# Patient Record
Sex: FEMALE | Race: WHITE | Employment: FULL TIME | URBAN - METROPOLITAN AREA
[De-identification: names, ages, dates, MRNs, and addresses within clinical notes are randomized per-mention and may not be internally consistent; named-entity substitution may affect disease eponyms.]

---

## 2017-05-27 PROCEDURE — 81001 URINALYSIS AUTO W/SCOPE: CPT | Performed by: FAMILY MEDICINE

## 2017-06-08 PROCEDURE — 87624 HPV HI-RISK TYP POOLED RSLT: CPT | Performed by: FAMILY MEDICINE

## 2017-06-08 PROCEDURE — 88175 CYTOPATH C/V AUTO FLUID REDO: CPT | Performed by: FAMILY MEDICINE

## 2019-08-20 PROCEDURE — 88175 CYTOPATH C/V AUTO FLUID REDO: CPT | Performed by: OBSTETRICS & GYNECOLOGY

## 2019-09-16 PROBLEM — R51.9 FREQUENT HEADACHES: Status: ACTIVE | Noted: 2019-09-16

## 2020-08-21 PROBLEM — J30.1 SEASONAL ALLERGIC RHINITIS DUE TO POLLEN: Status: ACTIVE | Noted: 2020-08-21

## 2020-08-21 PROBLEM — R51.9 FREQUENT HEADACHES: Status: RESOLVED | Noted: 2019-09-16 | Resolved: 2020-08-21

## 2020-08-21 PROBLEM — F41.9 ANXIETY: Status: ACTIVE | Noted: 2020-08-21

## 2020-08-21 PROBLEM — K21.9 GASTROESOPHAGEAL REFLUX DISEASE WITHOUT ESOPHAGITIS: Status: ACTIVE | Noted: 2020-08-21

## 2022-01-25 PROBLEM — Z34.00 SUPERVISION OF NORMAL FIRST PREGNANCY, ANTEPARTUM: Status: ACTIVE | Noted: 2022-01-25

## 2022-01-25 PROBLEM — Z34.00 SUPERVISION OF NORMAL FIRST PREGNANCY, ANTEPARTUM (HCC): Status: ACTIVE | Noted: 2022-01-25

## 2022-03-11 PROBLEM — D56.3 ALPHA THALASSEMIA SILENT CARRIER: Status: ACTIVE | Noted: 2022-03-11

## 2024-04-22 LAB
HEPATITIS B SURFACE ANTIGEN OB: NEGATIVE
RAPID PLASMA REAGIN OB: NONREACTIVE

## 2024-08-16 LAB
HIV RESULT OB: NEGATIVE
RAPID PLASMA REAGIN OB: NONREACTIVE

## 2024-10-17 ENCOUNTER — HOSPITAL ENCOUNTER (OUTPATIENT)
Facility: HOSPITAL | Age: 34
Discharge: HOME | End: 2024-10-17
Attending: OBSTETRICS & GYNECOLOGY | Admitting: OBSTETRICS & GYNECOLOGY
Payer: COMMERCIAL

## 2024-10-17 VITALS
DIASTOLIC BLOOD PRESSURE: 68 MMHG | BODY MASS INDEX: 30.78 KG/M2 | HEIGHT: 61 IN | SYSTOLIC BLOOD PRESSURE: 104 MMHG | RESPIRATION RATE: 16 BRPM | HEART RATE: 85 BPM | TEMPERATURE: 98 F | WEIGHT: 163 LBS

## 2024-10-17 PROCEDURE — 59025 FETAL NON-STRESS TEST: CPT

## 2024-10-17 PROCEDURE — 99204 OFFICE O/P NEW MOD 45 MIN: CPT

## 2024-10-17 NOTE — TRIAGE
[facility]  part of [facility]      Triage Note    Christal Marcus Patient Status:  Outpatient    1990 MRN [id number]   Location [facility] Attending Bri Wilson MD   Hosp Day # 0 PCP Ross Shrestha DO      Para:   Estimated Date of Delivery: 24  Gestation: 37w1d    Chief Complaint    R/o Rom         Allergies:  Allergies[1]    Orders Placed This Encounter   Procedures    Rubella Antibodies, IgG    RPR W/Conf    RPR W/Conf    Hepatitis B Surface Antigen    Rapid HIV    POCT Ferning       Lab Results   Component Value Date    WBC 9.49 2022    HGB 13.1 2022    HCT 39.3 2022     2022    CREATSERUM 0.53 2021    BUN 16.0 2021     2021    K 4.35 2021     2021    CO2 23.3 2021    GLU 84 2021    CA 9.5 2021    ALB 4.3 2022    ALKPHO 57 2022    BILT 0.23 2022    TP 7.4 2022    AST 17 2022    ALT 17 2022    T4F 1.30 2022    TSH 0.298 2022       Clinitek UA  Lab Results   Component Value Date    GLUUR Negative 2017    SPECGRAVITY 1.020 2017       UA  Lab Results   Component Value Date    COLORUR Yellow 2017    CLARITY Hazy (A) 2017    SPECGRAVITY 1.020 2017    PROUR Negative 2017    GLUUR Negative 2017    KETUR Negative 2017    BILUR Negative 2017    BLOODURINE Small (A) 2017    NITRITE Negative 2022    UROBILINOGEN <2.0 2017    LEUUR Negative 2017       Vitals:    10/17/24 1045 10/17/24 1051   BP: 104/68    Pulse: 85    Resp: 16    Temp: 97.7 °F (36.5 °C)    TempSrc: Oral    Weight:  73.9 kg (163 lb)   Height: 154.9 cm (5' 1\")        NST:  Reactive  Variability: Moderate           Accelerations: Yes           Decelerations: None            Baseline: 130 BPM           Uterine Irritability: No           Contractions: Not present                                         Acoustic Stimulator: No           Nonstress Test Interpretation: Reactive           Nonstress Test Second Interpretation: Reactive          FHR Category: Category I        Chief Complaint   Patient presents with    R/o Rom     Pt. Reports leaking fluid since 08.          @ 37 1/7 wks. here with c/o leaking.   SSE no pooling, negative amniotest, ferning not present.   NST reactive.  Dr. Wlison notified.     Discharged home  Ambulatory and in stable condition with written and verbal labor, and preeclampsia instructions. Patient verbalizes understanding of information given.     Michelle POLANCO RN  10/17/2024 11:31 AM         [1]   Allergies  Allergen Reactions    Amoxicillin HIVES

## 2024-10-17 NOTE — H&P
[facility]  part of [facility]    History & Physical    Christal Marcus Patient Status:  Outpatient    1990 MRN [id number]   Location [facility] Attending No att. providers found   Hosp Day # 0 PCP Ross Shrestha DO     Date of triage:  10/17/2024      HPI:   Christal Marcus is a 33 year old  female, current EGA of 37w1d with an estimated date of delivery of: 2024, by Last Menstrual Period who presents due to leakage of fluid    Pt reports feeling wet and was unsure if she broke her bag of water.    Pt denies N/V/F/C/CP/SOB, HA, blurry vision, dizziness, RUQ pain, ctx, VB.    Her current obstetrical history is significant for alpha thalassemia silent carrier, GDMA1, BMI 30.  Patient Active Problem List   Diagnosis    Scoliosis    Vitiligo    Seasonal allergic rhinitis due to pollen    Gastroesophageal reflux disease without esophagitis    Anxiety    Supervision of normal first pregnancy, antepartum (HCC)    Alpha thalassemia silent carrier         Fetal Movement reported as good.  GBS positive.   Rh negative.    History   Obstetric History:   OB History    Para Term  AB Living   2 1 1 0 0 1   SAB IAB Ectopic Multiple Live Births   0 0 0 0 1      # Outcome Date GA Lbr Cassius/2nd Weight Sex Type Anes PTL Lv   2 Current            1 Term 22 38w0d  7 lb 4 oz (3.289 kg) F  EPI N KARI      Obstetric Comments   3/2022: [facility] ([facility]) Genetic testing was negative for 273/274 conditions.  Pt is a SILENT CARRIER for Alpha-Thalassemia (aa/a-).         Gyne History:   Last pap smear: 2023: Negative cytology and HR-HPV not detected    Past Medical History:   Past Medical History:    Alpha thalassemia silent carrier    [facility] ([facility]) genetic testing       Past Surgerical History: No past surgical history on file.    Social History:   Social History     Tobacco Use    Smoking status: Never    Smokeless tobacco: Never   Substance Use  Topics    Alcohol use: Yes     Comment: social,occasional        Allergies/Medications:   Allergies:   Allergies[1]    Medications: PNV  Prescriptions Prior to Admission[2]      Review of Systems:   As documented in HPI      Physical Exam:   Temp:  [97.7 °F (36.5 °C)] 97.7 °F (36.5 °C)  Pulse:  [85] 85  Resp:  [16] 16  BP: (104)/(68) 104/68    Constitutional: alert and cooperative in No distress    Abdomen: soft,  nontender, gravid    Vaginal exam: PER RN: pooling negative, ferning (-), nitrazine (-).  Visually cervix FT.      FHT assessment:   Baseline: 130 bpm   Variability: moderate   Accels:  present   Decels: No   Tocos:  No   Category: 1 tracing    Neurologic: Alert and oriented  Psychiatric: Cooperative    Results:   No results found for this or any previous visit (from the past 24 hours).    No results found.        Assessment/Plan:   IUP 37w1d presented for r/o ROM    Obstetrical history significant for  alpha thalassemia silent carrier, GDMA1, BMI 30  Patient Active Problem List   Diagnosis    Scoliosis    Vitiligo    Seasonal allergic rhinitis due to pollen    Gastroesophageal reflux disease without esophagitis    Anxiety    Supervision of normal first pregnancy, antepartum (HCC)    Alpha thalassemia silent carrier         Treatment Plan:  Triage    Christal Marcus is a 33 year old  female, current EGA of 37w1d who presents for triage to rule out rupture of membranes     All questions answered; all appropriate consents will be signed at the Hospital.    IUP at 37w1d  Fetal heart tones category 1  Leakage of fluid: likely cervical mucous; on exam pooling (-), ferning (-), amnio/nitrazine (-); no evidence of active labor.  GBS positive: will need abx in labor  GDMA1: no acute issues; pt reports BS well controlled.    Disposition: discharge home; labor precautions and kick counts discussed.  Pt has an appt in 1 hr, explained she does not need to go to that appt.  She will return to the office next  week for appt and NST.      Bri Wilson MD  10/17/2024  12:31 PM        [1]   Allergies  Allergen Reactions    Amoxicillin HIVES   [2]   No medications prior to admission.

## 2024-10-26 ENCOUNTER — ANESTHESIA EVENT (OUTPATIENT)
Dept: OBGYN UNIT | Facility: HOSPITAL | Age: 34
End: 2024-10-26
Payer: COMMERCIAL

## 2024-10-26 ENCOUNTER — ANESTHESIA (OUTPATIENT)
Dept: OBGYN UNIT | Facility: HOSPITAL | Age: 34
End: 2024-10-26
Payer: COMMERCIAL

## 2024-10-26 ENCOUNTER — HOSPITAL ENCOUNTER (INPATIENT)
Facility: HOSPITAL | Age: 34
LOS: 2 days | Discharge: HOME | End: 2024-10-28
Attending: OBSTETRICS & GYNECOLOGY | Admitting: OBSTETRICS & GYNECOLOGY
Payer: COMMERCIAL

## 2024-10-26 DIAGNOSIS — Z34.90: Primary | ICD-10-CM

## 2024-10-26 LAB
ANTIBODY SCREEN: NEGATIVE
BASOPHILS # BLD AUTO: 0.03 X10(3) UL (ref 0–0.2)
BASOPHILS NFR BLD AUTO: 0.2 %
DEPRECATED RDW RBC AUTO: 43.4 FL (ref 35.1–46.3)
EOSINOPHIL # BLD AUTO: 0 X10(3) UL (ref 0–0.7)
EOSINOPHIL NFR BLD AUTO: 0 %
ERYTHROCYTE [DISTWIDTH] IN BLOOD BY AUTOMATED COUNT: 14.8 % (ref 11–15)
GLUCOSE BLDC GLUCOMTR-MCNC: 91 MG/DL (ref 70–99)
HCT VFR BLD AUTO: 39.5 %
HGB BLD-MCNC: 12.9 G/DL
IMM GRANULOCYTES # BLD AUTO: 0.1 X10(3) UL (ref 0–1)
IMM GRANULOCYTES NFR BLD: 0.7 %
LYMPHOCYTES # BLD AUTO: 0.92 X10(3) UL (ref 1–4)
LYMPHOCYTES NFR BLD AUTO: 6.1 %
MCH RBC QN AUTO: 26.5 PG (ref 26–34)
MCHC RBC AUTO-ENTMCNC: 32.7 G/DL (ref 31–37)
MCV RBC AUTO: 81.3 FL
MONOCYTES # BLD AUTO: 0.64 X10(3) UL (ref 0.1–1)
MONOCYTES NFR BLD AUTO: 4.2 %
NEUTROPHILS # BLD AUTO: 13.41 X10 (3) UL (ref 1.5–7.7)
NEUTROPHILS # BLD AUTO: 13.41 X10(3) UL (ref 1.5–7.7)
NEUTROPHILS NFR BLD AUTO: 88.8 %
PLATELET # BLD AUTO: 169 10(3)UL (ref 150–450)
RBC # BLD AUTO: 4.86 X10(6)UL
RH BLOOD TYPE: POSITIVE
RH BLOOD TYPE: POSITIVE
T PALLIDUM AB SER QL IA: NONREACTIVE
WBC # BLD AUTO: 15.1 X10(3) UL (ref 4–11)

## 2024-10-26 PROCEDURE — 82962 GLUCOSE BLOOD TEST: CPT

## 2024-10-26 PROCEDURE — 99214 OFFICE O/P EST MOD 30 MIN: CPT

## 2024-10-26 PROCEDURE — 86901 BLOOD TYPING SEROLOGIC RH(D): CPT | Performed by: OBSTETRICS & GYNECOLOGY

## 2024-10-26 PROCEDURE — 85025 COMPLETE CBC W/AUTO DIFF WBC: CPT | Performed by: OBSTETRICS & GYNECOLOGY

## 2024-10-26 PROCEDURE — 86850 RBC ANTIBODY SCREEN: CPT | Performed by: OBSTETRICS & GYNECOLOGY

## 2024-10-26 PROCEDURE — 86900 BLOOD TYPING SEROLOGIC ABO: CPT | Performed by: OBSTETRICS & GYNECOLOGY

## 2024-10-26 PROCEDURE — 86780 TREPONEMA PALLIDUM: CPT | Performed by: OBSTETRICS & GYNECOLOGY

## 2024-10-26 PROCEDURE — 0HQ9XZZ REPAIR PERINEUM SKIN, EXTERNAL APPROACH: ICD-10-PCS | Performed by: OBSTETRICS & GYNECOLOGY

## 2024-10-26 RX ORDER — LIDOCAINE HYDROCHLORIDE AND EPINEPHRINE 15; 5 MG/ML; UG/ML
INJECTION, SOLUTION EPIDURAL
Status: COMPLETED | OUTPATIENT
Start: 2024-10-26 | End: 2024-10-26

## 2024-10-26 RX ORDER — IBUPROFEN 600 MG/1
600 TABLET, FILM COATED ORAL EVERY 6 HOURS
Status: DISCONTINUED | OUTPATIENT
Start: 2024-10-26 | End: 2024-10-27

## 2024-10-26 RX ORDER — BUPIVACAINE HYDROCHLORIDE 2.5 MG/ML
20 INJECTION, SOLUTION EPIDURAL; INFILTRATION; INTRACAUDAL ONCE
Status: DISCONTINUED | OUTPATIENT
Start: 2024-10-26 | End: 2024-10-26

## 2024-10-26 RX ORDER — LIDOCAINE HYDROCHLORIDE 10 MG/ML
30 INJECTION, SOLUTION EPIDURAL; INFILTRATION; INTRACAUDAL; PERINEURAL ONCE
Status: DISCONTINUED | OUTPATIENT
Start: 2024-10-26 | End: 2024-10-26

## 2024-10-26 RX ORDER — ACETAMINOPHEN 500 MG
1000 TABLET ORAL EVERY 6 HOURS PRN
Status: DISCONTINUED | OUTPATIENT
Start: 2024-10-26 | End: 2024-10-26

## 2024-10-26 RX ORDER — ACETAMINOPHEN 500 MG
500 TABLET ORAL EVERY 6 HOURS PRN
Status: DISCONTINUED | OUTPATIENT
Start: 2024-10-26 | End: 2024-10-28

## 2024-10-26 RX ORDER — BUPIVACAINE HCL/0.9 % NACL/PF 0.25 %
5 PLASTIC BAG, INJECTION (ML) EPIDURAL AS NEEDED
Status: DISCONTINUED | OUTPATIENT
Start: 2024-10-26 | End: 2024-10-26

## 2024-10-26 RX ORDER — ACETAMINOPHEN 500 MG
1000 TABLET ORAL EVERY 6 HOURS PRN
Status: DISCONTINUED | OUTPATIENT
Start: 2024-10-26 | End: 2024-10-28

## 2024-10-26 RX ORDER — ACETAMINOPHEN 500 MG
500 TABLET ORAL EVERY 6 HOURS PRN
Status: DISCONTINUED | OUTPATIENT
Start: 2024-10-26 | End: 2024-10-26

## 2024-10-26 RX ORDER — NALBUPHINE HYDROCHLORIDE 10 MG/ML
2.5 INJECTION INTRAMUSCULAR; INTRAVENOUS; SUBCUTANEOUS
Status: DISCONTINUED | OUTPATIENT
Start: 2024-10-26 | End: 2024-10-26

## 2024-10-26 RX ORDER — CITRIC ACID/SODIUM CITRATE 334-500MG
30 SOLUTION, ORAL ORAL AS NEEDED
Status: DISCONTINUED | OUTPATIENT
Start: 2024-10-26 | End: 2024-10-26

## 2024-10-26 RX ORDER — HYDROXYZINE HYDROCHLORIDE 50 MG/ML
50 INJECTION, SOLUTION INTRAMUSCULAR EVERY 6 HOURS PRN
Status: DISCONTINUED | OUTPATIENT
Start: 2024-10-26 | End: 2024-10-26

## 2024-10-26 RX ORDER — LIDOCAINE HYDROCHLORIDE 10 MG/ML
INJECTION, SOLUTION INFILTRATION; PERINEURAL
Status: COMPLETED | OUTPATIENT
Start: 2024-10-26 | End: 2024-10-26

## 2024-10-26 RX ORDER — BUPIVACAINE HYDROCHLORIDE 2.5 MG/ML
INJECTION, SOLUTION EPIDURAL; INFILTRATION; INTRACAUDAL
Status: COMPLETED | OUTPATIENT
Start: 2024-10-26 | End: 2024-10-26

## 2024-10-26 RX ORDER — AMMONIA INHALANTS 0.04 G/.3ML
0.3 INHALANT RESPIRATORY (INHALATION) AS NEEDED
Status: DISCONTINUED | OUTPATIENT
Start: 2024-10-26 | End: 2024-10-28

## 2024-10-26 RX ORDER — BISACODYL 10 MG
10 SUPPOSITORY, RECTAL RECTAL ONCE AS NEEDED
Status: DISCONTINUED | OUTPATIENT
Start: 2024-10-26 | End: 2024-10-28

## 2024-10-26 RX ORDER — DOCUSATE SODIUM 100 MG/1
100 CAPSULE, LIQUID FILLED ORAL
Status: DISCONTINUED | OUTPATIENT
Start: 2024-10-26 | End: 2024-10-27

## 2024-10-26 RX ORDER — CHOLECALCIFEROL (VITAMIN D3) 25 MCG
1 TABLET,CHEWABLE ORAL DAILY
COMMUNITY

## 2024-10-26 RX ORDER — SODIUM CHLORIDE, SODIUM LACTATE, POTASSIUM CHLORIDE, CALCIUM CHLORIDE 600; 310; 30; 20 MG/100ML; MG/100ML; MG/100ML; MG/100ML
INJECTION, SOLUTION INTRAVENOUS AS NEEDED
Status: DISCONTINUED | OUTPATIENT
Start: 2024-10-26 | End: 2024-10-26

## 2024-10-26 RX ORDER — TERBUTALINE SULFATE 1 MG/ML
0.25 INJECTION, SOLUTION SUBCUTANEOUS AS NEEDED
Status: DISCONTINUED | OUTPATIENT
Start: 2024-10-26 | End: 2024-10-26

## 2024-10-26 RX ORDER — FERROUS SULFATE 325(65) MG
325 TABLET, DELAYED RELEASE (ENTERIC COATED) ORAL
COMMUNITY

## 2024-10-26 RX ORDER — IBUPROFEN 600 MG/1
600 TABLET, FILM COATED ORAL ONCE AS NEEDED
Status: DISCONTINUED | OUTPATIENT
Start: 2024-10-26 | End: 2024-10-26

## 2024-10-26 RX ORDER — SIMETHICONE 80 MG
80 TABLET,CHEWABLE ORAL 3 TIMES DAILY PRN
Status: DISCONTINUED | OUTPATIENT
Start: 2024-10-26 | End: 2024-10-28

## 2024-10-26 RX ORDER — ONDANSETRON 2 MG/ML
4 INJECTION INTRAMUSCULAR; INTRAVENOUS EVERY 6 HOURS PRN
Status: DISCONTINUED | OUTPATIENT
Start: 2024-10-26 | End: 2024-10-26

## 2024-10-26 RX ORDER — NALBUPHINE HYDROCHLORIDE 10 MG/ML
10 INJECTION INTRAMUSCULAR; INTRAVENOUS; SUBCUTANEOUS EVERY 6 HOURS PRN
Status: DISCONTINUED | OUTPATIENT
Start: 2024-10-26 | End: 2024-10-26

## 2024-10-26 RX ADMIN — BUPIVACAINE HYDROCHLORIDE 1 ML: 2.5 INJECTION, SOLUTION EPIDURAL; INFILTRATION; INTRACAUDAL at 09:00:00

## 2024-10-26 RX ADMIN — LIDOCAINE HYDROCHLORIDE AND EPINEPHRINE 3 ML: 15; 5 INJECTION, SOLUTION EPIDURAL at 09:00:00

## 2024-10-26 RX ADMIN — LIDOCAINE HYDROCHLORIDE 5 ML: 10 INJECTION, SOLUTION INFILTRATION; PERINEURAL at 09:00:00

## 2024-10-26 NOTE — ANESTHESIA PROCEDURE NOTES
Labor Analgesia    Date/Time: 10/26/2024 9:00 AM    Performed by: Kaden Shipley MD  Authorized by: Kaden Shipley MD      General Information and Staff    Start Time:  10/26/2024 9:00 AM  End Time:  10/26/2024 9:10 AM  Anesthesiologist:  Kaden Shipley MD  Performed by:  Anesthesiologist  Patient Location:  OB  Site Identification: surface landmarks    Reason for Block: labor epidural    Preanesthetic Checklist: patient identified, IV checked, site marked, risks and benefits discussed, monitors and equipment checked, pre-op evaluation, timeout performed, anesthesia consent and sterile technique used      Procedure Details    Patient Position:  Sitting  Prep: ChloraPrep    Monitoring:  Heart rate  Approach:  Midline    Epidural Needle    Injection Technique:  RAFFI air  Needle Type:  Tuohy  Needle Gauge:  18 G  Needle Length:  3.5 in  Needle Insertion Depth:  5  Location:  L2-3    Spinal Needle    Needle Type:  Sprotte tip  Needle Gauge:  27 G    Catheter    Catheter Type:  Multi-orifice  Catheter Size:  20 G  Test Dose:  Negative    Assessment      Additional Comments     Cse 2.5 mg bup

## 2024-10-26 NOTE — ANESTHESIA PREPROCEDURE EVALUATION
Anesthesia PreOp Note    HPI:     Christal Marcus is a 33 year old female who presents for preoperative consultation requested by: * No surgeons listed *    Date of Surgery: 10/26/2024    * No procedures listed *  Indication: * No pre-op diagnosis entered *    Relevant Problems   No relevant active problems       NPO:                         History Review:  Patient Active Problem List    Diagnosis Date Noted    Term pregnancy ([facility]) 10/26/2024    Alpha thalassemia silent carrier 03/11/2022    Supervision of normal first pregnancy, antepartum ([facility]) 01/25/2022    Seasonal allergic rhinitis due to pollen 08/21/2020    Gastroesophageal reflux disease without esophagitis 08/21/2020    Anxiety 08/21/2020    Scoliosis 02/26/2010    Vitiligo 02/26/2010       Past Medical History:    Alpha thalassemia silent carrier    [facility] ([facility]) genetic testing       No past surgical history on file.    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  Lab Results    Component Value Date    WBC 15.1 (H) 10/26/2024    RBC 4.86 10/26/2024    HGB 12.9 10/26/2024    HCT 39.5 10/26/2024    MCV 81.3 10/26/2024    MCH 26.5 10/26/2024    MCHC 32.7 10/26/2024    RDW 14.8 10/26/2024    .0 10/26/2024     Lab Results   Component Value Date    PGLU 91 10/26/2024          Vital Signs:  Body mass index is 30.8 kg/m².   weight is 73.9 kg (163 lb). Her blood pressure is 115/66 and her pulse is 67.   Vitals:    10/26/24 0500 10/26/24 0515 10/26/24 0530 10/26/24 0833   BP: 116/79 108/73 115/66    Pulse: 77 72 67    Weight:    73.9 kg (163 lb)        Anesthesia Evaluation     Patient summary reviewed and Nursing notes reviewed    No history of anesthetic complications   Airway   Mallampati: I  TM distance: >3 FB  Neck ROM: full  Dental      Pulmonary - negative ROS and normal exam   Cardiovascular - negative ROS and normal exam    Neuro/Psych    (+)  anxiety/panic attacks,        GI/Hepatic/Renal    (+) GERD    Endo/Other    Abdominal                  Anesthesia Plan:   ASA:  2  Plan:   Epidural  Informed Consent Plan and Risks Discussed With:  Patient      I have informed Christal A Spillone and/or legal guardian or family member of the nature of the anesthetic plan, benefits, risks including possible dental damage if relevant, major complications, and any alternative forms of anesthetic management.   All of the patient's questions were answered to the best of my ability. The patient desires the anesthetic management as planned.  Kaden Shipley MD  10/26/2024 9:28 AM  Present on Admission:  **None**           [1]   Medications Prior to Admission   Medication Sig Dispense Refill Last Dose/Taking    prenatal vitamin with DHA 27-0.8-228 MG Oral Cap Take 1 capsule by mouth daily.   10/25/2024    ferrous sulfate 325 (65 FE) MG Oral Tab EC Take 1 tablet (325 mg total) by mouth daily with breakfast.   10/25/2024   [2]   Current Facility-Administered Medications Ordered in Epic   Medication Dose  Route Frequency Provider Last Rate Last Admin    lactated ringers infusion   Intravenous PRN Daphne Herrera MD        lactated ringers IV bolus 500 mL  500 mL Intravenous PRN Daphne Herrera MD        acetaminophen (Tylenol Extra Strength) tab 500 mg  500 mg Oral Q6H PRN Daphne Herrera MD        acetaminophen (Tylenol Extra Strength) tab 1,000 mg  1,000 mg Oral Q6H PRN Daphne Herrera MD        ibuprofen (Motrin) tab 600 mg  600 mg Oral Once PRN Daphne Herrera MD        ondansetron (Zofran) 4 MG/2ML injection 4 mg  4 mg Intravenous Q6H PRN Daphne Herrera MD        oxyTOCIN in sodium chloride 0.9% (Pitocin) 30 Units/500mL infusion premix  62.5-900 fanny-units/min Intravenous Continuous Daphne Herrera MD        terbutaline (Brethine) 1 MG/ML injection 0.25 mg  0.25 mg Subcutaneous PRN Daphne Herrera MD        sodium citrate-citric acid (Bicitra) 500-334 MG/5ML oral solution 30 mL  30 mL Oral PRN Daphne Herrera MD        lidocaine PF (Xylocaine-MPF) 1% injection  30 mL Intradermal Once Daphne Herrera MD        nalbuphine (Nubain) 10 mg/mL injection 10 mg  10 mg Intramuscular Q6H PRN Daphne Herrera MD        And    hydrOXYzine 50 mg/mL injection 50 mg  50 mg Intramuscular Q6H PRN Daphne Herrera MD        ceFAZolin (Ancef) 1 g in dextrose 5% 100mL IVPB-ADD  1 g Intravenous Q8H Daphne Herrera MD        fentaNYL-bupivacaine 2 mcg/mL-0.125% in sodium chloride 0.9% 100 mL EPIDURAL infusion premix   Epidural Continuous Kaden Shipley MD        fentaNYL (Sublimaze) 50 mcg/mL injection 100 mcg  100 mcg Epidural Once Kaden Shipley MD        bupivacaine PF (Marcaine) 0.25% injection  20 mL Epidural Once Kaden Shipley MD        EPHEDrine (PF) 25 MG/5 ML injection 5 mg  5 mg Intravenous PRN Kaden Shipley MD        nalbuphine (Nubain) 10 mg/mL injection 2.5 mg  2.5 mg Intravenous Q15 Min PRN Kaden Shipley MD         No current Epic-ordered outpatient medications on file.   [3]    Allergies  Allergen Reactions    Amoxicillin HIVES

## 2024-10-26 NOTE — H&P
[facility]  part of [facility]    History & Physical    Christal Marcus Patient Status:  Inpatient    1990 MRN [id number]   Location [facility] Attending Bri Wilson MD   Hosp Day # 0 PCP Ross Shrestha DO     Date of Admission:  10/26/2024      HPI:   Christal Marcus is a 33 year old  female, current EGA of 38w3d with an estimated date of delivery of: 2024, by Last Menstrual Period who presents due to contractions..    Being admitted for labor management.      Pt denies N/V/F/C/CP/SOB, HA, blurry vision, dizziness, RUQ pain, ctx, lof, VB.    Her current obstetrical history is significant for  GDMA1, low lying placenta (resolved) and alpha thal carrier .    Patient Active Problem List   Diagnosis    Scoliosis    Vitiligo    Seasonal allergic rhinitis due to pollen    Gastroesophageal reflux disease without esophagitis    Anxiety    Supervision of normal first pregnancy, antepartum (HCC)    Alpha thalassemia silent carrier    Term pregnancy (HCC)         Fetal Movement reported as good.  GBS positive.   Rh positive.    History   Obstetric History:   OB History    Para Term  AB Living   2 1 1 0 0 1   SAB IAB Ectopic Multiple Live Births   0 0 0 0 1      # Outcome Date GA Lbr Cassius/2nd Weight Sex Type Anes PTL Lv   2 Current            1 Term 22 38w0d  7 lb 4 oz (3.289 kg) F  EPI N KARI      Obstetric Comments   3/2022: [facility] ([facility]) Genetic testing was negative for 273/274 conditions.  Pt is a SILENT CARRIER for Alpha-Thalassemia (aa/a-).         Past Medical History:   Past Medical History:    Alpha thalassemia silent carrier    [facility] ([facility]) genetic testing       Past Surgerical History: No past surgical history on file.    Social History:   Social History     Tobacco Use    Smoking status: Never    Smokeless tobacco: Never   Substance Use Topics    Alcohol use: Yes     Comment: social,occasional         Allergies/Medications:   Allergies:   Allergies[1]    Medications:  Prescriptions Prior to Admission[2]      Review of Systems:   As documented in HPI      Physical Exam:   Pulse:  [67-77] 67  BP: (108-116)/(66-79) 115/66    Constitutional: alert and cooperative in No distress    Abdomen: soft,  nontender, gravid    Vaginal exam: Per RN  Dilation: 4.5 cm    Effacement: 70 %    Station: -2        FHT assessment:   Baseline: 130 bpm   Variability: moderate   Accels:  present   Decels: No   Tocos:  contractions every 3-9 minute   Category: 1 tracing    Neurologic: Alert and oriented  Psychiatric: Cooperative    Results:     Recent Results (from the past 24 hours)   CBC With Differential With Platelet    Collection Time: 10/26/24  8:13 AM   Result Value Ref Range    WBC 15.1 (H) 4.0 - 11.0 x10(3) uL    RBC 4.86 3.80 - 5.30 x10(6)uL    HGB 12.9 12.0 - 16.0 g/dL    HCT 39.5 35.0 - 48.0 %    MCV 81.3 80.0 - 100.0 fL    MCH 26.5 26.0 - 34.0 pg    MCHC 32.7 31.0 - 37.0 g/dL    RDW-SD 43.4 35.1 - 46.3 fL    RDW 14.8 11.0 - 15.0 %    .0 150.0 - 450.0 10(3)uL    Neutrophil Absolute Prelim 13.41 (H) 1.50 - 7.70 x10 (3) uL    Neutrophil Absolute 13.41 (H) 1.50 - 7.70 x10(3) uL    Lymphocyte Absolute 0.92 (L) 1.00 - 4.00 x10(3) uL    Monocyte Absolute 0.64 0.10 - 1.00 x10(3) uL    Eosinophil Absolute 0.00 0.00 - 0.70 x10(3) uL    Basophil Absolute 0.03 0.00 - 0.20 x10(3) uL    Immature Granulocyte Absolute 0.10 0.00 - 1.00 x10(3) uL    Neutrophil % 88.8 %    Lymphocyte % 6.1 %    Monocyte % 4.2 %    Eosinophil % 0.0 %    Basophil % 0.2 %    Immature Granulocyte % 0.7 %       No results found.        Assessment/Plan:   IUP 38w3d  in / with Early latent labor.    Obstetrical history significant for gestational DM.   Patient Active Problem List   Diagnosis    Scoliosis    Vitiligo    Seasonal allergic rhinitis due to pollen    Gastroesophageal reflux disease without esophagitis    Anxiety    Supervision of normal first  pregnancy, antepartum (HCC)    Alpha thalassemia silent carrier    Term pregnancy (HCC)         Treatment Plan:  Expectant management.    Christal Marcus is a 33 year old  female, current EGA of 38w3d who presents for admission due to labor    Risks, benefits, alternatives and possible complications have been discussed in detail with the patient.   Pre-admission, admission, and post admission procedures and expectations were discussed in detail.    All questions answered; all appropriate consents will be signed at the Hospital.    IUP at 38w3d  Fetal heart tones category 1  Labor: admit, routine labs, expectant management, epidural if patient desires, will AROM if appropriate  GBS positive, start ancef for prophylaxis  CPM      Daphne Herrera MD  10/26/2024  8:43 AM       [1]   Allergies  Allergen Reactions    Amoxicillin HIVES   [2]   Medications Prior to Admission   Medication Sig Dispense Refill Last Dose/Taking    prenatal vitamin with DHA 27-0.8-228 MG Oral Cap Take 1 capsule by mouth daily.   10/25/2024    ferrous sulfate 325 (65 FE) MG Oral Tab EC Take 1 tablet (325 mg total) by mouth daily with breakfast.   10/25/2024

## 2024-10-26 NOTE — PROGRESS NOTES
Pt is a 33 year old female admitted to TR1/TR1-A.     Chief Complaint   Patient presents with    R/o Labor      Pt is  38w3d intra-uterine pregnancy.  History obtained, consents signed. Oriented to room, staff, and plan of care.

## 2024-10-26 NOTE — ANESTHESIA POSTPROCEDURE EVALUATION
Patient: Christal Marcus    Procedure Summary       Date: 10/26/24 Room / Location:     Anesthesia Start: 0929 Anesthesia Stop: 1408    Procedure: LABOR ANALGESIA Diagnosis:     Scheduled Providers:  Anesthesiologist: Kaden Shipley MD    Anesthesia Type: epidural ASA Status: 2            Anesthesia Type: epidural    Vitals Value Taken Time   /56 10/26/24 1414   Temp  10/26/24 1414   Pulse 129 10/26/24 1406   Resp 16 10/26/24 1414   SpO2 97 % 10/26/24 1406   Vitals shown include unfiled device data.    EM AN Post Evaluation:   Patient Evaluated in floor  Patient Participation: complete - patient participated  Level of Consciousness: awake  Pain Management: adequate  Airway Patency:patent  Yes    Nausea/Vomiting: none  Cardiovascular Status: acceptable  Respiratory Status: acceptable  Postoperative Hydration acceptable      Kaden Shipley MD  10/26/2024 2:14 PM

## 2024-10-26 NOTE — PROGRESS NOTES
Patient up to bathroom with assist x 2.  Voided at this time. Patient transferred to mother/baby room 356 per wheelchair in stable condition with baby and personal belongings.  Accompanied by significant other and staff.  Report given to mother/baby RN Ryne.

## 2024-10-26 NOTE — L&D DELIVERY NOTE
Tirso Marcus [[id number]]      Labor Events     labor?: No   steroids?: None  Antibiotics received during labor?: Yes  Antibiotics (enter # doses in comment): cefazolin (Comment: 1 dose)  Rupture date/time: 10/26/2024 1231     Rupture type: AROM  Fluid color: Clear  Labor type: Spontaneous Onset of Labor  Augmentation: AROM  Indications for augmentation: Ineffective Contraction Pattern  Intrapartum & labor complications: Group B beta strep +       Labor Event Times    Labor onset date/time: 10/25/2024 1700  Dilation complete date/time: 10/26/2024 1355       [place] Presentation    Presentation: Vertex  Position: Right Occiput Anterior       Operative Delivery    Operative Vaginal Delivery: No                Shoulder Dystocia    Shoulder Dystocia: No       Anesthesia    Method: Epidural               Delivery      Head delivery date/time: 10/26/2024 14:06:19   Delivery date/time:  10/26/24 14:06:24   Delivery type: Normal spontaneous vaginal delivery    Details:     Delivery location: delivery room  Delivery Room Temperature: 72       Delivery Providers    Delivering Clinician: Daphne Herrera MD   Delivery personnel:  Provider Role   Tamar Loaiza RN Baby Nurse   Irma Davidson RN Delivery Nurse             Cord    Vessels: 3 Vessels  Complications: None  Timed cord clamping: Yes  Time in sec: 30  Cord blood disposition: to lab  Gases sent?: No       Resuscitation    Method: None        Measurements    No data filed       Placenta    Date/time: 10/26/2024 1408  Removal: Spontaneous  Appearance: Intact  Disposition: Discarded       Apgars    No data filed       Skin to Skin    No data filed       Vaginal Count    Initial count RN: Irma Davidson RN  Initial count Tech: Tosin Balderas    Initial counts 10   0    Final counts               Lacerations    Episiotomy: None  Perineal lacerations: 1st Repaired?: Yes     Vaginal laceration?: No       Cervical laceration?: No    Clitoral laceration?: No                  [facility]  part of [facility]    Vaginal Delivery Note    Christal Marcus Patient Status:  Inpatient    1990 MRN [id number]   Location [facility] Attending Bri Wilson MD   Hosp Day # 0 PCP Ross Shrestha DO     Delivery     Surgeon: Daphne Herrera MD    Maternal Anesthesia: epidural     Infant  Date of Delivery: 10/26/2024   Time of Delivery: 2:06 PM  Delivery Type: Normal spontaneous vaginal delivery    Infant Sex  Information for the patient's :  Tirso Marcus [[id number]]   male    Infant Birthweight  Information for the patient's :  Tirso Marcus [[id number]]   No birth weight on file.     Presentation Vertex [1]  Position Right [3] Occiput [1] Anterior [1]    Apgars:  1 minute:                 5 minutes:                          10 minutes:      Placenta:  Date/Time of Delivery: 10/26/2024  2:08 PM   Delivery: spontaneous  Placenta to Pathology: no    Umbilical Cord:  Cord Gases Submitted: no  Cord Blood/Tissue Collection: yes  Cord Complications: none  Sponge and Needle Counts:  Verified      Episiotomy/Laceration Repair  Laceration: perineal 1st degree    Delivery Complications  none    Neonatologist Present: no    Delivery Narrative:   The patient was admitted to labor and delivery.  Her GBS testing was positive and therefore she received antibiotics in labor.  She underwent artificial rupture of membranes and clear fluid was noted.  She progressed through the active stage of labor.  She pushed for approximately 10 minutes and  delivered a live male in REENA position.  Upon delivery of the fetal head, the neck was checked for a nuchal cord which was not noted. After delivery of the head, the shoulders delivered with gentle downward axial traction. Infant handed to awaiting mother with nurses at bedside.  Delayed cord clamping for 60 seconds was performed.  Placenta delivered without difficulty, was intact and was not sent to pathology.   Repair of 1st degree perineal laceration performed using 2-0 vicryl. Cervix was inspected and no cervical lacerations or trailing membranes noted.  No uterine inversion noted. The mother and infant were stable at the time of this note.      EBL see deliverry summary      Daphne Herrera MD   10/26/2024  2:38 PM

## 2024-10-27 LAB
BASOPHILS # BLD AUTO: 0.03 X10(3) UL (ref 0–0.2)
BASOPHILS NFR BLD AUTO: 0.2 %
DEPRECATED RDW RBC AUTO: 43.9 FL (ref 35.1–46.3)
EOSINOPHIL # BLD AUTO: 0.06 X10(3) UL (ref 0–0.7)
EOSINOPHIL NFR BLD AUTO: 0.5 %
ERYTHROCYTE [DISTWIDTH] IN BLOOD BY AUTOMATED COUNT: 15.2 % (ref 11–15)
GLUCOSE BLDC GLUCOMTR-MCNC: 91 MG/DL (ref 70–99)
HCT VFR BLD AUTO: 32.5 %
HGB BLD-MCNC: 10.5 G/DL
IMM GRANULOCYTES # BLD AUTO: 0.07 X10(3) UL (ref 0–1)
IMM GRANULOCYTES NFR BLD: 0.6 %
LYMPHOCYTES # BLD AUTO: 2.02 X10(3) UL (ref 1–4)
LYMPHOCYTES NFR BLD AUTO: 16.7 %
MCH RBC QN AUTO: 26 PG (ref 26–34)
MCHC RBC AUTO-ENTMCNC: 32.3 G/DL (ref 31–37)
MCV RBC AUTO: 80.4 FL
MONOCYTES # BLD AUTO: 0.85 X10(3) UL (ref 0.1–1)
MONOCYTES NFR BLD AUTO: 7 %
NEUTROPHILS # BLD AUTO: 9.1 X10 (3) UL (ref 1.5–7.7)
NEUTROPHILS # BLD AUTO: 9.1 X10(3) UL (ref 1.5–7.7)
NEUTROPHILS NFR BLD AUTO: 75 %
PLATELET # BLD AUTO: 146 10(3)UL (ref 150–450)
RBC # BLD AUTO: 4.04 X10(6)UL
WBC # BLD AUTO: 12.1 X10(3) UL (ref 4–11)

## 2024-10-27 PROCEDURE — 82962 GLUCOSE BLOOD TEST: CPT

## 2024-10-27 PROCEDURE — 85025 COMPLETE CBC W/AUTO DIFF WBC: CPT | Performed by: OBSTETRICS & GYNECOLOGY

## 2024-10-27 RX ORDER — DOCUSATE SODIUM 100 MG/1
100 CAPSULE, LIQUID FILLED ORAL 2 TIMES DAILY
Status: DISCONTINUED | OUTPATIENT
Start: 2024-10-28 | End: 2024-10-28

## 2024-10-27 RX ORDER — OXYCODONE HYDROCHLORIDE 5 MG/1
5 TABLET ORAL EVERY 4 HOURS PRN
Status: DISCONTINUED | OUTPATIENT
Start: 2024-10-27 | End: 2024-10-28

## 2024-10-27 RX ORDER — IBUPROFEN 600 MG/1
600 TABLET, FILM COATED ORAL EVERY 6 HOURS PRN
Status: DISCONTINUED | OUTPATIENT
Start: 2024-10-27 | End: 2024-10-28

## 2024-10-27 NOTE — LACTATION NOTE
LACTATION NOTE - MOTHER      Evaluation Type: Inpatient    Problems identified  Problems identified: Knowledge deficit    Maternal history  Maternal history: Anxiety  Other/comment: GERD    Breastfeeding goal  Breastfeeding goal: To maintain breast milk feeding per patient goal    Maternal Assessment  Bilateral Breasts: Pendulous;Soft;Symmetrical  Bilateral Nipples: WNL  Prior breastfeeding experience (comment below): Multip;Successful  Breastfeeding Assistance: Breastfeeding assistance provided with permission    Pain assessment  Location/Comment: denies  Treatment of Sore Nipples: [place]                   Mother was breastfeeding as I entered the room. Deep latch observed. Made minor positioning suggestions. Encouraged STS. Discussed hand expression and spoon feeding if the infant is too sleepy to nurse. Discussed normal NB behavior. Educated patient about supply/demand and the importance of frequent stimulation. Encouraged to call LC if assistance with breastfeeding is needed.    Mom states had some over supply issues with her first, and baby had jaundice issues. Discussed avoiding pumping unless very necessary, and engorgement management

## 2024-10-27 NOTE — LACTATION NOTE
This note was copied from a baby's chart.  LACTATION NOTE - INFANT    Evaluation Type  Evaluation Type: Inpatient    Problems & Assessment  Problems Diagnosed or Identified: 37-38 weeks gestation  Infant Assessment: Hunger cues present  Muscle tone: Appropriate for GA    Feeding Assessment  Summary Current Feeding: Breastfeeding exclusively  Breastfeeding Assessment: Assisted with breastfeeding w/mother's permission;Calm and ready to breastfeed;Sustained nutrititive latch w/audible swallows;Pulling on nipple  Breastfeeding Positions: cradle;cross cradle;right breast  Latch: Repeated attempts, hold nipple in mouth, stimulate to suck  Audible Sucks/Swallows: A few with stimulation  Type of Nipple: Everted (after stimulation)  Comfort (Breast/Nipple): Soft/non-tender  Hold (Positioning): Full assist, teach one side, mother does other, staff holds  LATCH Score: 7

## 2024-10-27 NOTE — DISCHARGE INSTRUCTIONS
[facility] has great support for our families even after discharge.  We have virtual or in-person support groups.  Visit our website for the most up-to-date info for our many different support groups. [email or web address]       Outpatient Lactation appointments:  Call [phone]- to schedule an appt.  Our office is located in the Maternal Fetal Medicine office next to [facility] on the first floor.        Support Groups: Moms-to-be are also welcome! All mom's welcome even if its not your first.     MOM & BABY HOUR- Every new mom can use some support in the exciting but challenging adjustment to motherhood. Join us for Mom and Baby Hour where an experienced nurse and lactation consultant will guide conversations and answer questions and provide breastfeeding support.  Most weeks we will also have a guest speaker to present information on many different parenting topics. We welcome mothers and their infants (up to crawling), dad, grandmas, and others to join our group.    Meets most  10:00 - 11:30 a.m.  Masks are not required, but be considerate of others and do not attend if mom or baby have had any symptoms of illness within the previous 24 hours. Location [facility] [place] [address] [place] Go inside the front door and to the right to the “Community Education Room”.      Nurturing Mom- A support group for new and expectant moms looking for support with the transition to parenthood as well as those experiencing symptoms of  anxiety and/or depression.  Please contact [email or web address] if you need directions or the link for the virtual meetings. Please contact [email or web address] if you plan to attend, but please be considerate of others and do not attend if mom or baby have had any symptoms of illness within the previous 24 hours.       Mom's Line: [phone]  A phone line dedicated for women (or anyone  worried about a women) who may be experiencing signs or symptoms of postpartum depression, anxiety, or overwhelmed with new baby. Call 24/7- answered by live trained mental health professionals, free and confidential, emotional support, referrals, in any language.    La Leche League for breast feeding and parent support, Website: IIIi.org  and for the [place] group and other groups visit [email or web address]    Facebook groups-  for more support when home- Babies & Mommies [facility] --- you can find mom-to-mom advice and the list of speaker topics for cradle talk program.  Telephone Support  Postpartum depression [place] (www.ppdil.org)  -Free information and support for pregnant and postpartum women with symptoms of depression, anxiety  -Mom-to-mom support from volunteers who have been through depression    Telephone support: [phone]  Urgent [phone] (answered 24/7)  Email support: support@ppdil.org    Postpartum Support International (www.postpartum.net)  -Free phone conversation with trained volunteers   ([phone]; after the prompt enter 08479#    National Postpartum Depression Hotline  (067)-PPD-MOMS    Helpful websites:    www.llli.org  www.YourNextLeap  www.Breastfeed[place].org    Initiation of breast pumping after discharge:     - Add 1 pumping session a day, additional to the infant's feedings, 2-3 weeks after delivery.     - Pump both breasts for 15 mins, immediately after a breast feeding session.    - Pumping first thing in the morning will provide greater output.    - If you chose to pump more than once a day, you should be consistent every day to prevent a breast infection.      - Pump using an electric pump over a hands free pump (electric pumps provided stronger stimulation to the nipples).    - Store the breast milk in 2-3 oz containers.     - Label containers with date and time.    - Always feed oldest milk first.

## 2024-10-27 NOTE — PROGRESS NOTES
[facility]  part of [facility]    OB/Gyne Post  Progress Note      Christal Marcus Patient Status:  Inpatient    1990 MRN [id number]   Location [facility] 3SE Attending Bri Wilson MD   Hosp Day # 1 PCP Ross Shrestha,        Subjective     Pt denies N/V/F/C/CP/SOB/palpitations, dizziness, headache, blurry vision, leg pain/calf pain.       Good pain control.   Tolerating present diet.   Ambulating well. Voiding freely.  Breastfeeding: Yes   Vaginal bleeding: Decreasing     Objective   Vital signs in last 24 hours:  Temp:  [97.9 °F (36.6 °C)-98.2 °F (36.8 °C)] 97.9 °F (36.6 °C)  Pulse:  [] 61  Resp:  [16] 16  BP: ()/() 97/64  SpO2:  [96 %-100 %] 97 %    Input/Output:    Intake/Output Summary (Last 24 hours) at 10/27/2024 0856  Last data filed at 10/26/2024 2031  Gross per 24 hour   Intake 100 ml   Output 1364 ml   Net -1264 ml         Constitutional: comfortable  Abdomen: soft, nontender, nondistended  Uterus: fundus firm and 2 cm below umbilicus,   Extremities: No calf tenderness; no c/c/e      Results:   Labs / Path / Radiology:    Recent Results (from the past 24 hours)   ABORH Confirmation    Collection Time: 10/26/24 10:33 AM   Result Value Ref Range    ABO BLOOD TYPE AB     RH BLOOD TYPE Positive    POCT Glucose    Collection Time: 10/27/24  6:13 AM   Result Value Ref Range    POC Glucose  91 70 - 99 mg/dL   CBC With Differential With Platelet    Collection Time: 10/27/24  6:14 AM   Result Value Ref Range    WBC 12.1 (H) 4.0 - 11.0 x10(3) uL    RBC 4.04 3.80 - 5.30 x10(6)uL    HGB 10.5 (L) 12.0 - 16.0 g/dL    HCT 32.5 (L) 35.0 - 48.0 %    MCV 80.4 80.0 - 100.0 fL    MCH 26.0 26.0 - 34.0 pg    MCHC 32.3 31.0 - 37.0 g/dL    RDW-SD 43.9 35.1 - 46.3 fL    RDW 15.2 (H) 11.0 - 15.0 %    .0 (L) 150.0 - 450.0 10(3)uL    Neutrophil Absolute Prelim 9.10 (H) 1.50 - 7.70 x10 (3) uL    Neutrophil Absolute 9.10 (H) 1.50 - 7.70 x10(3) uL    Lymphocyte  Absolute 2.02 1.00 - 4.00 x10(3) uL    Monocyte Absolute 0.85 0.10 - 1.00 x10(3) uL    Eosinophil Absolute 0.06 0.00 - 0.70 x10(3) uL    Basophil Absolute 0.03 0.00 - 0.20 x10(3) uL    Immature Granulocyte Absolute 0.07 0.00 - 1.00 x10(3) uL    Neutrophil % 75.0 %    Lymphocyte % 16.7 %    Monocyte % 7.0 %    Eosinophil % 0.5 %    Basophil % 0.2 %    Immature Granulocyte % 0.6 %       Specimens (From admission, onward)      None            No results found.      Assessment/Plan   33 year oldyo  , s/p spontaneous vaginal, PPD# 1       Patient Active Problem List   Diagnosis    Scoliosis    Vitiligo    Seasonal allergic rhinitis due to pollen    Gastroesophageal reflux disease without esophagitis    Anxiety    Supervision of normal first pregnancy, antepartum (HCC)    Alpha thalassemia silent carrier    Term pregnancy ([facility])   .    Postpartum:  -Pt doing well  -Pain tolerable and controlled  -Breastfeeding, lactation consultant available fo assistance    2. Heme:  Hgb s/p delivery 10.5  Acute blood loss anemia  Asymptomatic and hemodynamically stable  Will encourage cont PNV and increase intake of iron rich foods    3. Disposition:  ambulate, continue routine postpartum care              The patient had a male infant, and does desire circumcision.  She was consented for infant circumcision risks including, but not limited to, bleeding, infection, trauma to other tissue, and need for further procedures.  The patient expressed understanding, denied questions, and wishes to proceed with the procedure for her son.      Daphne Herrera MD  10/27/2024  8:56 AM

## 2024-10-28 VITALS
RESPIRATION RATE: 16 BRPM | SYSTOLIC BLOOD PRESSURE: 107 MMHG | HEART RATE: 56 BPM | BODY MASS INDEX: 31 KG/M2 | OXYGEN SATURATION: 97 % | DIASTOLIC BLOOD PRESSURE: 55 MMHG | TEMPERATURE: 99 F | WEIGHT: 163 LBS

## 2024-10-28 RX ORDER — OXYCODONE HYDROCHLORIDE 5 MG/1
5 TABLET ORAL EVERY 6 HOURS PRN
Qty: 10 TABLET | Refills: 0 | Status: SHIPPED | OUTPATIENT
Start: 2024-10-28

## 2024-10-28 RX ORDER — ACETAMINOPHEN 500 MG
1000 TABLET ORAL EVERY 6 HOURS PRN
Qty: 30 TABLET | Refills: 0 | Status: SHIPPED | COMMUNITY
Start: 2024-10-28

## 2024-10-28 RX ORDER — IBUPROFEN 600 MG/1
600 TABLET, FILM COATED ORAL EVERY 6 HOURS PRN
Qty: 30 TABLET | Refills: 0 | Status: SHIPPED | COMMUNITY
Start: 2024-10-28

## 2024-10-28 NOTE — PROGRESS NOTES
[facility]  part of [facility]    OB/Gyne Postpartum Progress Note      Christal Marcus Patient Status:  Inpatient    1990 MRN [id number]   Location [facility] 3SE Attending Bri Wilson MD   Hosp Day # 2 PCP Ross Shrestha DO       Subjective      Patient feeling well.   Pain at a specific spot in her Right lower abdomen. Had butt/low back pain yesterday that has improved. Passing flatus and just recently had bowel movemnent  Lochia appropriate.   Tolerating diet. Denies N/V  Ambulating. Spontaneously voiding.   Breastfeeding    Objective   Vital signs in last 24 hours:  Temp:  [98 °F (36.7 °C)-98.5 °F (36.9 °C)] 98.5 °F (36.9 °C)  Pulse:  [55-56] 56  Resp:  [16-18] 16  BP: (100-107)/(55-77) 107/55    Input/Output:  No intake or output data in the 24 hours ending 10/28/24 1127      Constitutional: comfortable  Pulm: non labored breathing  CV: regular rate  Abdomen: soft, nontender, nondistended, BS present   Uterus: fundus firm at umbilicus,   Extremities: No calf tenderness      Results:   Labs / Path / Radiology:    No results found for this or any previous visit (from the past 24 hours).    Recent Labs   Lab 10/26/24  0813 10/27/24  0614   RBC 4.86 4.04   HGB 12.9 10.5*   HCT 39.5 32.5*   MCV 81.3 80.4   MCH 26.5 26.0   MCHC 32.7 32.3   RDW 14.8 15.2*   NEPRELIM 13.41* 9.10*   WBC 15.1* 12.1*   .0 146.0*         Assessment/Plan   33 year oldyo  , s/p spontaneous vaginal, PPD# 2      Postpartum:  -Rh pos, RI / breast / male  -Meeting milestones    Discussed likely MSK nature of abdominal pain. Offered muscle relaxant vs another does of oxycodone as that worked well for her yesterday. Desires oxycodone.   If pain manageable will plan to dc today.   See back in OB office in 6 weeks for PP check.   Postpartum precautions reviewed.     Mallory Rowe DO  10/28/2024  11:27 AM

## 2024-10-28 NOTE — PLAN OF CARE
Problem: PAIN - ADULT  Goal: Verbalizes/displays adequate comfort level or patient's stated pain goal  Description: INTERVENTIONS:  - Encourage pt to monitor pain and request assistance  - Assess pain using appropriate pain scale  - Administer analgesics based on type and severity of pain and evaluate response  - Implement non-pharmacological measures as appropriate and evaluate response  - Consider cultural and social influences on pain and pain management  - Manage/alleviate anxiety  - Utilize distraction and/or relaxation techniques  - Monitor for opioid side effects  - Notify MD/LIP if interventions unsuccessful or patient reports new pain  - Anticipate increased pain with activity and pre-medicate as appropriate  10/28/2024 1505 by Yuridia Cintron, RN  Outcome: Completed  10/28/2024 0950 by Yuridia Cintron RN  Outcome: Progressing     Problem: ANXIETY  Goal: Will report anxiety at manageable levels  Description: INTERVENTIONS:  - Administer medication as ordered  - Teach and rehearse alternative coping skills  - Provide emotional support with 1:1 interaction with staff  10/28/2024 1505 by Yuridia Cintron RN  Outcome: Completed  10/28/2024 0950 by Yuridia Cintron RN  Outcome: Progressing     Problem: POSTPARTUM  Goal: Long Term Goal:Experiences normal postpartum course  Description: INTERVENTIONS:  - Assess and monitor vital signs and lab values.  - Assess fundus and lochia.  - Provide ice/sitz baths for perineum discomfort.  - Monitor healing of incision/episiotomy/laceration, and assess for signs and symptoms of infection and hematoma.  - Assess bladder function and monitor for bladder distention.  - Provide/instruct/assist with pericare as needed.  - Provide VTE prophylaxis as needed.  - Monitor bowel function.  - Encourage ambulation and provide assistance as needed.  - Assess and monitor emotional status and provide social service/psych resources as needed.  - Utilize standard precautions and use  personal protective equipment as indicated. Ensure aseptic care of all intravenous lines and invasive tubes/drains.  - Obtain immunization and exposure to communicable diseases history.  10/28/2024 1505 by Yuridia Cintron RN  Outcome: Completed  10/28/2024 0950 by Yuridia Cintron, RN  Outcome: Progressing  Goal: Optimize infant feeding at the breast  Description: INTERVENTIONS:  - Initiate breast feeding within first hour after birth.   - Monitor effectiveness of current breast feeding efforts.  - Assess support systems available to mother/family.  - Identify cultural beliefs/practices regarding lactation, letdown techniques, maternal food preferences.  - Assess mother's knowledge and previous experience with breast feeding.  - Provide information as needed about early infant feeding cues (e.g., rooting, lip smacking, sucking fingers/hand) versus late cue of crying.  - Discuss/demonstrate breast feeding aids (e.g., infant sling, nursing footstool/pillows, and breast pumps).  - Encourage mother/other family members to express feelings/concerns, and actively listen.  - Educate father/SO about benefits of breast feeding and how to manage common lactation challenges.  - Recommend avoidance of specific medications or substances incompatible with breast feeding.  - Assess and monitor for signs of nipple pain/trauma.  - Instruct and provide assistance with proper latch.  - Review techniques for milk expression (breast pumping) and storage of breast milk. Provide pumping equipment/supplies, instructions and assistance, as needed.  - Encourage rooming-in and breast feeding on demand.  - Encourage skin-to-skin contact.  - Provide LC support as needed.  - Assess for and manage engorgement.  - Provide breast feeding education handouts and information on community breast feeding support.   10/28/2024 1505 by Yuridia Cintron, RN  Outcome: Completed  10/28/2024 0950 by Yuridia Cintron, RN  Outcome: Progressing  Goal:  Establishment of adequate milk supply with medication/procedure interruptions  Description: INTERVENTIONS:  - Review techniques for milk expression (breast pumping).   - Provide pumping equipment/supplies, instructions, and assistance until it is safe to breastfeed infant.  10/28/2024 1505 by Yuridia Cintron, RN  Outcome: Completed  10/28/2024 0950 by Yuridia Cintron, RN  Outcome: Progressing  Goal: Appropriate maternal -  bonding  Description: INTERVENTIONS:  - Assess caregiver- interactions.  - Assess caregiver's emotional status and coping mechanisms.  - Encourage caregiver to participate in  daily care.  - Assess support systems available to mother/family.  - Provide /case management support as needed.  10/28/2024 1505 by Yuridia Cintron, RN  Outcome: Completed  10/28/2024 0950 by Yuridia Cintron, RN  Outcome: Progressing

## 2024-10-28 NOTE — LACTATION NOTE
10/28/24 0805   Evaluation Type   Evaluation Type Inpatient   Problems identified   Problems identified Knowledge deficit;Milk supply WNL   Maternal history   Maternal history Anxiety   Other/comment GERD   Breastfeeding goal   Breastfeeding goal To maintain breast milk feeding per patient goal   Maternal Assessment   Bilateral Breasts   (Did not assess at time of consult)   Bilateral Nipples   (did not assess at time of consult)   Prior breastfeeding experience (comment below) Multip;Successful   Prior BF experience: comment Used nipple shield. Was an over .   Breastfeeding Assistance LC assistance declined at this time   Pain assessment   Pain, additional Pain w/initial sucks only   Treatment of Sore Nipples Deeper latch techniques;Expressed breast milk;Hydrogel dressings as directed   Guidelines for use of:   Equipment Hydrogel dressings   Current use of pump: not currently using.   Suggested use of pump Avoid overstimulation of milk supply;Pump if infant is not latching to breast   Other (comment) LC assistance declined. Patient is going home. LC answered questions about nipple pain and comfort techqniues. LC educated on deeper latch techqniues and signs of a nutritive feeding pattern. Mother states she can tell when the latch is shallow vs a deep latch. LC reviewed duration of feedings and feeding frequencies. LC reviewed expected I&O's. Due to mother having an over supply of breastmilk, LC encouraged her to use the pump sparingly, only for comfort if needed. Mother stated she had an active let down with her first and that she was only eating for the letdown. LC educated her on techqniues for a fast let down. LC provided patient with hydrogels. All questions answered. LC outpatient information reviewed.

## 2024-10-28 NOTE — DISCHARGE SUMMARY
[facility]  part of [facility]    Discharge Summary    Christal Marcus Patient Status:  Inpatient    1990 MRN [id number]   Location [facility] 3SE Attending No att. providers found   Hosp Day # 2 PCP Ross Shrestha DO     Date of Admission: 10/26/2024    Admission Diagnoses: Term pregnancy (HCC)    Date of Discharge: 10/28/24    Discharge Diagnoses:S/P Vaginal Delivery    Episode Diagnoses:   Pregnancy Problems (from 10/17/24 to present)       No problems associated with this episode.                  Hospital Course:     EDC: Estimated Date of Delivery: 24    Gestational Age: 38w3d    Date of Delivery: 10/26/2024  Time of Delivery: 2:06 PM    Antepartum complications: gestational diabetes    Delivered By: AMRITA NOYOLA    Delivery Method: Normal spontaneous vaginal delivery    Delivery Procedures:     Baby: male      Apgars:  1 minute:   8                 5 minutes: 9                          10 minutes:      Feeding Method:The patient is currently breastfeeding.     Intrapartum Complications: None    Lacerations      Perineal lacerations: 1st Repaired?: Yes     Vaginal laceration?: No      Cervical laceration?: No    Clitoral laceration?: No             Episiotomy: None    Placenta: Spontaneous    Postpartum complications: None      Discharge Plan:   Discharge Condition: Good    Discharge medications:  Discharge Medication List as of 10/28/2024  1:41 PM        New Orders    Details   acetaminophen 500 MG Oral Tab Take 2 tablets (1,000 mg total) by mouth every 6 (six) hours as needed., OTC, Disp-30 tablet, R-0      ibuprofen 600 MG Oral Tab Take 1 tablet (600 mg total) by mouth every 6 (six) hours as needed., OTC, Disp-30 tablet, R-0      oxyCODONE 5 MG Oral Tab Take 1 tablet (5 mg total) by mouth every 6 (six) hours as needed for Pain., Normal, Disp-10 tablet, R-0           Home Meds - Unchanged    Details   prenatal vitamin with DHA 27-0.8-228 MG Oral Cap Take 1  capsule by mouth daily., Historical      ferrous sulfate 325 (65 FE) MG Oral Tab EC Take 1 tablet (325 mg total) by mouth daily with breakfast., Historical                   Discharge Diet: As tolerated    Discharge Activity: Pelvic rest until cleared    Follow up:     Follow Up in the Office: 6 weeks for postpartum visit     Follow-up Information       [facility]. Call.    Specialty: Pediatrics  Why: As needed  Contact information:  [address]  [facility] [address]  [phone]  Additional information:  Masks are optional for all patients and visitors, unless otherwise indicated.             Bri Wilson MD Follow up in 6 week(s).    Specialty: OBSTETRICS & GYNECOLOGY  Contact information:  [address]  [facility] [id number] [phone]                                   Other Discharge Instructions:         [facility] has great support for our families even after discharge.  We have virtual or in-person support groups.  Visit our website for the most up-to-date info for our many different support groups. [email or web address]       Outpatient Lactation appointments:  Call [phone]- to schedule an appt.  Our office is located in the Maternal Fetal Medicine office next to [facility] on the first floor.        Support Groups: Moms-to-be are also welcome! All mom's welcome even if its not your first.     MOM & BABY HOUR- Every new mom can use some support in the exciting but challenging adjustment to motherhood. Join us for Mom and Baby Hour where an experienced nurse and lactation consultant will guide conversations and answer questions and provide breastfeeding support.  Most weeks we will also have a guest speaker to present information on many different parenting topics. We welcome mothers and their infants (up to crawling), dad, grandmas, and others to join our group.    Meets most Wednesdays 10:00 - 11:30  a.m.  Masks are not required, but be considerate of others and do not attend if mom or baby have had any symptoms of illness within the previous 24 hours. Location [facility] [place] [address] [place] Go inside the front door and to the right to the “Community Education Room”.      Nurturing Mom- A support group for new and expectant moms looking for support with the transition to parenthood as well as those experiencing symptoms of  anxiety and/or depression.  Please contact [email or web address] if you need directions or the link for the virtual meetings. Please contact [email or web address] if you plan to attend, but please be considerate of others and do not attend if mom or baby have had any symptoms of illness within the previous 24 hours.       Mom's Line: [phone]  A phone line dedicated for women (or anyone worried about a women) who may be experiencing signs or symptoms of postpartum depression, anxiety, or overwhelmed with new baby. Call - answered by live trained mental health professionals, free and confidential, emotional support, referrals, in any language.    La Leche League for breast feeding and parent support, Website: IIIi.aScentias  and for the [place] group and other groups visit https://www.facebook.com/pg/Doretha/events/    Facebook groups-  for more support when home- [facility]ies & Mommies [facility] --- you can find mom-to-mom advice and the list of speaker topics for cradle talk program.  Telephone Support  Postpartum depression [place] (www.ppdil.org)  -Free information and support for pregnant and postpartum women with symptoms of depression, anxiety  -Mom-to-mom support from volunteers who have been through depression    Telephone support: (824) [phone]  Urgent [phone])-[phone] (answered )  Email support: support@ppdil.org    Postpartum Support International (www.postpartum.net)  -Free phone conversation with  trained volunteers   ([phone] 722-8706; after the prompt enter 08828#    National Postpartum Depression Hotline  (605)-PPD-MOMS    Helpful websites:    www.llli.org  www.Twitt2go  www.Breastfeed[place].org    Initiation of breast pumping after discharge:     - Add 1 pumping session a day, additional to the infant's feedings, 2-3 weeks after delivery.     - Pump both breasts for 15 mins, immediately after a breast feeding session.    - Pumping first thing in the morning will provide greater output.    - If you chose to pump more than once a day, you should be consistent every day to prevent a breast infection.      - Pump using an electric pump over a hands free pump (electric pumps provided stronger stimulation to the nipples).    - Store the breast milk in 2-3 oz containers.     - Label containers with date and time.    - Always feed oldest milk first.                           Mallory Rowe, DO  10/28/2024

## 2024-11-19 ENCOUNTER — TELEPHONE (OUTPATIENT)
Dept: OBGYN UNIT | Facility: HOSPITAL | Age: 34
End: 2024-11-19

## (undated) NOTE — LETTER
[facility]  Administration of Anesthesia  IChristal agree to be cared for by a physician anesthesiologist alone and/or with a nurse anesthetist, who is specially trained to monitor me and give me medicine to put me to sleep or keep me comfortable during my procedure    I understand that my anesthesiologist and/or anesthetist is not an employee or agent of [facility] or [employer]. He or she works for [facility] [facility]    As the patient asking for anesthesia services, I agree to:  Allow the anesthesiologist (anesthesia doctor) to give me medicine and do additional procedures as necessary. Some examples are: Starting or using an “IV” to give me medicine, fluids or blood during my procedure, and having a breathing tube placed to help me breathe when I’m asleep (intubation). In the event that my heart stops working properly, I understand that my anesthesiologist will make every effort to sustain my life, unless otherwise directed by [facility] Do Not Resuscitate documents.  Tell my anesthesia doctor before my procedure:  If I am pregnant.  The last time that I ate or drank.  iii. All of the medicines I take (including prescriptions, herbal supplements, and pills I can buy without a prescription (including street drugs/illegal medications). Failure to inform my anesthesiologist about these medicines may increase my risk of anesthetic complications.  iv.If I am allergic to anything or have had a reaction to anesthesia before.  I understand how the anesthesia medicine will help me (benefits).  I understand that with any type of anesthesia medicine there are risks:  The most common risks are: nausea, vomiting, sore throat, muscle soreness, damage to my eyes, mouth, or teeth (from breathing tube placement).  Rare risks include: remembering what happened during my procedure, allergic reactions to medications, injury to my airway, heart, lungs, vision, nerves, or  muscles and in extremely rare instances death.  My doctor has explained to me other choices available to me for my care (alternatives).  Pregnant Patients (“epidural”):  I understand that the risks of having an epidural (medicine given into my back to help control pain during labor), include itching, low blood pressure, difficulty urinating, headache or slowing of the baby’s heart. Very rare risks include infection, bleeding, seizure, irregular heart rhythms and nerve injury.  Regional Anesthesia (“spinal”, “epidural”, & “nerve blocks”):  I understand that rare but potential complications include headache, bleeding, infection, seizure, irregular heart rhythms, and nerve injury.    _____________________________________________________________________________  Patient (or Representative) Signature/Relationship to Patient  Date   Time    _____________________________________________________________________________   Name (if used)    Language/Organization   Time    _____________________________________________________________________________  Nurse Anesthetist Signature     Date   Time  _____________________________________________________________________________  Anesthesiologist Signature     Date   Time  I have discussed the procedure and information above with the patient (or patient’s representative) and answered their questions. The patient or their representative has agreed to have anesthesia services.    _____________________________________________________________________________  Witness        Date   Time  I have verified that the signature is that of the patient or patient’s representative, and that it was signed before the procedure  Patient Name: Christal Marcus     : 1990                 Printed: 10/26/2024 at 7:47 AM    Medical Record #: [id number]                                            Page 1 of 1  ----------ANESTHESIA CONSENT----------